# Patient Record
Sex: FEMALE | Race: WHITE | Employment: FULL TIME | URBAN - METROPOLITAN AREA
[De-identification: names, ages, dates, MRNs, and addresses within clinical notes are randomized per-mention and may not be internally consistent; named-entity substitution may affect disease eponyms.]

---

## 2024-07-18 ENCOUNTER — OFFICE VISIT (OUTPATIENT)
Age: 29
End: 2024-07-18
Payer: COMMERCIAL

## 2024-07-18 VITALS
HEIGHT: 67 IN | BODY MASS INDEX: 30.61 KG/M2 | SYSTOLIC BLOOD PRESSURE: 115 MMHG | OXYGEN SATURATION: 98 % | WEIGHT: 195 LBS | DIASTOLIC BLOOD PRESSURE: 79 MMHG | HEART RATE: 71 BPM

## 2024-07-18 DIAGNOSIS — E66.9 OBESITY (BMI 30-39.9): Primary | ICD-10-CM

## 2024-07-18 PROCEDURE — 99203 OFFICE O/P NEW LOW 30 MIN: CPT | Performed by: GENERAL ACUTE CARE HOSPITAL

## 2024-07-18 RX ORDER — SERTRALINE HCL 25 MG
TABLET ORAL
COMMUNITY
Start: 2023-03-17

## 2024-07-18 RX ORDER — DEXAMETHASONE 1 MG
TABLET ORAL
Qty: 1 TABLET | Refills: 0 | Status: SHIPPED | OUTPATIENT
Start: 2024-07-18

## 2024-07-18 NOTE — PATIENT INSTRUCTIONS
Dexamethasone Suppression Test  Find a day that would work well for you to report to the nearest hospital lab.   The day before this, you will need to be sure you picked up the dexamethasone tablet (1mg).    -This tablet was already ordered to your pharmacy.   -Do not take the tablet if you are not sure if you will make it to the lab the next AM.    -Do not have the blood work collected if you did not take the tablet the night before.   The night before appearing at the lab, you will need to take the dexamethasone tablet at 11 PM.   The following morning, you need to be at the lab first thing in the morning to have your blood-work collected around 8 AM.   All of the blood work ordered can be collected at that time.   I will be in touch with you regarding the results and will discuss any necessary next steps.

## 2024-07-19 ENCOUNTER — TELEPHONE (OUTPATIENT)
Age: 29
End: 2024-07-19

## 2024-07-20 LAB
IGF-I SERPL-MCNC: 181 NG/ML (ref 91–308)
T4 FREE SERPL-MCNC: 1.3 NG/DL (ref 0.82–1.77)
TSH SERPL DL<=0.005 MIU/L-ACNC: 1.23 UIU/ML (ref 0.45–4.5)

## 2024-07-27 LAB — CORTIS AM PEAK SERPL-MCNC: 0.6 UG/DL (ref 6.2–19.4)

## 2024-07-29 ENCOUNTER — PATIENT MESSAGE (OUTPATIENT)
Age: 29
End: 2024-07-29

## 2024-08-01 ENCOUNTER — TELEPHONE (OUTPATIENT)
Age: 29
End: 2024-08-01

## 2024-08-01 LAB — DEXAMETHASONE SERPL-MCNC: 128 NG/DL

## 2024-08-05 DIAGNOSIS — R53.83 FATIGUE, UNSPECIFIED TYPE: ICD-10-CM

## 2024-08-06 PROBLEM — E66.9 OBESITY (BMI 30-39.9): Status: ACTIVE | Noted: 2024-08-06

## 2024-08-06 NOTE — PROGRESS NOTES
I have reviewed all needed documentation in preparation for visit. Verified patient by name and date of birth  Rupinder Yang is a 28 y.o. female New Patient (Patient was referred by Elisa Elias NP due to hormonal inbalance. )  .    Vitals:    07/18/24 1115   BP: 115/79   Pulse: 71   SpO2: 98%   Weight: 88.5 kg (195 lb)   Height: 1.702 m (5' 7\")       Patient's last menstrual period was 06/27/2024.         Health Maintenance Review: Patient reminded of \"due or due soon\" health maintenance. I have asked the patient to contact his/her primary care provider (PCP) for follow-up on his/her health maintenance.    \"Have you been to the ER, urgent care clinic since your last visit?  Hospitalized since your last visit?\"    NO    “Have you seen or consulted any other health care providers outside of [facility] since your last visit?”    Yes seen by PCP in [facility].       
 [facility]  DR MOSES Bucio Soo Yang is a 28 y.o. female  has no past medical history on file.       Trouble with losing weight   01/2023 started gaining weight 40 lbs   Pretty gradual increase   5 times per week with weight lifting , does some cardio with strength training   Eats clean diet     WEIGHT HISTORY 150  Early Childhood   2 years ago when she started gaining weight.   Max body weight: 195 lbs, now 195 lbs   Periods of unexplained rapid weight gain: ***  History of weight loss previously: ***  Had depression and anxiety  Has no children     Adipose tissue than muscle muscle gain    FACTORS CONTRIBUTING TO OBESITY  Family history of obesity:  mother has   Reduced physical activity: ***  Sleep: gets 7-9 hours of restful sleep per night   Stress: feels it is much better in the past 2 years   Menses regular     Use of pharmacotherapy for weight loss to date:  takes phentermine 7.5mg 2-3 weeks ago started  Bariatric intervention for weight loss to date: -    DIET HISTORY  24h food recall:  B: wakes up 5:30 am, drinks prework out with creatine, then works out, goes to work, then 10-10:30 drinks protein shakes and fruit  L: 1-1:30 chicken sandwich with no bun  D: 7-7:30 pm burrito bowl, quinnoa , black beans, seasoned chicken breast, avocado  Snacks: -  Drinks: 80-100oz per day, occass diet soda, coffee little bit of creamer     Diet restrictions: denies   Restaurants/take out: 1x/week     Dietary habits:  Excessive hunger: -  Binge eating: -  Emotional eating: has emotional eating, eats snacks, v occassional may cause a binge afterwards   Nighttime eating: -  History of eating disorder: -    Weight related and metabolic comorbidities:  Hypertension ***  Hyperlipidemia ***      Not on File    Review of Systems:  - Cardiovascular: no chest pain  - Neurological: no tremors  - Integumentary: skin is normal     Physical Examination:    There were no vitals 
loss to date:  takes phentermine 7.5mg 2-3 weeks ago started  Bariatric intervention for weight loss to date: -    DIET HISTORY  24h food recall:  B: wakes up 5:30 am, drinks prework out with creatine, then works out, goes to work, then 10-10:30 drinks protein shakes and fruit  L: 1-1:30 chicken sandwich with no bun  D: 7-7:30 pm burrito bowl, quinnoa , black beans, seasoned chicken breast, avocado  Snacks: -  Drinks: 80-100oz per day, occass diet soda, coffee little bit of creamer     Diet restrictions: denies   Restaurants/take out: 1x/week     Dietary habits:  Excessive hunger: -  Binge eating: -  Emotional eating: has emotional eating, eats snacks, v occassional may cause a binge afterwards   Nighttime eating: -  History of eating disorder: -    Weight related and metabolic comorbidities:  Hypertension -  Hyperlipidemia -      REVIEW OF LABORATORY AND RADIOLOGY DATA:   Labs and documentation have been reviewed as described above.       Allergies   Allergen Reactions    Sulfa Antibiotics Hives and Rash       Review of Systems:  - Cardiovascular: no chest pain  - Neurological: no tremors  - Integumentary: skin is normal     Physical Examination:    /79   Pulse 71   Ht 1.702 m (5' 7\")   Wt 88.5 kg (195 lb)   LMP 06/27/2024   SpO2 98%   BMI 30.54 kg/m²   General: pleasant, no distress, good eye contact   Neck: no thyromegaly or lymphadenopathy  Cardiovascular: regular, normal rate, nl s1 and s2, no m/r/g   Integumentary: skin is normal, no edema  Neurological: reflexes 2+ at biceps, no tremors  Psychiatric: normal mood and affect    RTC = 3 months    Please note that this dictation was completed with [employer], the [employer] voice recognition software.  Quite often unanticipated grammatical, syntax, homophones, and other interpretive errors are inadvertently transcribed by the computer software.  Efforts were made to correct these errors in proofreading. Please excuse any errors that have escaped final

## 2024-08-07 LAB — CORTIS AM PEAK SERPL-MCNC: 19 UG/DL (ref 6.2–19.4)

## 2024-08-09 ENCOUNTER — TELEPHONE (OUTPATIENT)
Age: 29
End: 2024-08-09